# Patient Record
(demographics unavailable — no encounter records)

---

## 2025-01-15 NOTE — PHYSICAL EXAM
[Right] : right shoulder [Mild] : mild [4 ___] : forward flexion 4[unfilled]/5 [5___] : external rotation 5[unfilled]/5 [] : no sensory deficits [Left] : left shoulder [Sitting] : sitting [FreeTextEntry9] : 165/60/T12. [TWNoteComboBox4] : passive forward flexion 150 degrees [de-identified] : external rotation 70 degrees

## 2025-01-15 NOTE — IMAGING
[Right] : right shoulder [FreeTextEntry1] : 2V: The GH joint is ok. There are slight AC changes.  [FreeTextEntry5] : 2V: There is a type II acromion with a spur.

## 2025-01-15 NOTE — CONSULT LETTER
[Dear  ___] : Dear  [unfilled], [FreeTextEntry1] : Thank you for referring your patient for consultation.  Please see my note below.   If you have any questions, please do not hesitate to contact me.   Sincerely,   Garrett Cruz M.D. Shoulder Surgery

## 2025-01-15 NOTE — HISTORY OF PRESENT ILLNESS
[3] : 3 [Dull/Aching] : dull/aching [Intermittent] : intermittent [Sleep] : sleep [Lying in bed] : lying in bed [Retired] : Work status: retired [de-identified] : Right shoulder pain on and off for a few weeks. No known injury.  [] : no [FreeTextEntry1] : Right shoulder [FreeTextEntry7] : to her elbow and sometimes her wrist [FreeTextEntry9] : Advil

## 2025-01-15 NOTE — REASON FOR VISIT
[FreeTextEntry2] : This is a 73 year old RHD female retired  with right shoulder pain since the end of December 2024 without injury. No prior history/treatment for this side. She previously saw Dr. Cruz in 2021 for left shoulder impingement which was treated with an injection and PT. Reaching is sometimes uncomfortable. Night symptoms can occur. There is no n/t. NSAID use as needed with temporary relief. She has known osteoporosis. There is minimal but continued left shoulder discomfort.

## 2025-01-15 NOTE — ASSESSMENT
[FreeTextEntry1] : We discussed the underlying pathology. Treatment options reviewed. Medication use discussed. MDP is prescribed.  Aleve 440mg use discussed to be taken after MDP.  PT is planned.  If symptoms persist, an MRI is planned.  Cautions discussed. Questions answered.  Patient seen by Dr. Garrett Cruz, who determined the assessment and plan I, Dana Levine, am scribing for Dr. Garrett Cruz in his presence for the chief complaint, physical exam, studies, assessment, and/or plan.

## 2025-01-28 NOTE — PHYSICAL EXAM
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  Patient verbalized understanding.   [No Acute Distress] : no acute distress [Normal Sclera/Conjunctiva] : normal sclera/conjunctiva [Normal Outer Ear/Nose] : the outer ears and nose were normal in appearance [No JVD] : no jugular venous distention [No Lymphadenopathy] : no lymphadenopathy [No Respiratory Distress] : no respiratory distress  [No Accessory Muscle Use] : no accessory muscle use [Clear to Auscultation] : lungs were clear to auscultation bilaterally [Normal Rate] : normal rate  [Regular Rhythm] : with a regular rhythm [No Carotid Bruits] : no carotid bruits [No Edema] : there was no peripheral edema [No Extremity Clubbing/Cyanosis] : no extremity clubbing/cyanosis [Declined Breast Exam] : declined breast exam  [Soft] : abdomen soft [Non Tender] : non-tender [Non-distended] : non-distended [No Masses] : no abdominal mass palpated [Normal Anterior Cervical Nodes] : no anterior cervical lymphadenopathy [No Spinal Tenderness] : no spinal tenderness [Grossly Normal Strength/Tone] : grossly normal strength/tone [No Rash] : no rash [No Focal Deficits] : no focal deficits [Normal Gait] : normal gait [Normal Affect] : the affect was normal [Normal Insight/Judgement] : insight and judgment were intact

## 2025-01-28 NOTE — HEALTH RISK ASSESSMENT
[Yes] : Yes [2 - 4 times a month (2 pts)] : 2-4 times a month (2 points) [1 or 2 (0 pts)] : 1 or 2 (0 points) [Never (0 pts)] : Never (0 points) [No] : In the past 12 months have you used drugs other than those required for medical reasons? No [No falls in past year] : Patient reported no falls in the past year [0] : 2) Feeling down, depressed, or hopeless: Not at all (0) [PHQ-2 Negative - No further assessment needed] : PHQ-2 Negative - No further assessment needed [Never] : Never [None] : None [With Family] : lives with family [Retired] : retired [] :  [Feels Safe at Home] : Feels safe at home [Fully functional (bathing, dressing, toileting, transferring, walking, feeding)] : Fully functional (bathing, dressing, toileting, transferring, walking, feeding) [Fully functional (using the telephone, shopping, preparing meals, housekeeping, doing laundry, using] : Fully functional and needs no help or supervision to perform IADLs (using the telephone, shopping, preparing meals, housekeeping, doing laundry, using transportation, managing medications and managing finances) [Reports changes in hearing] : Reports changes in hearing [Reports normal functional visual acuity (ie: able to read med bottle)] : Reports normal functional visual acuity [With Patient/Caregiver] : , with patient/caregiver [Designated Healthcare Proxy] : Designated healthcare proxy [Relationship: ___] : Relationship: [unfilled] [Audit-CScore] : 2 [[id number]] : 0 [Change in mental status noted] : No change in mental status noted [Language] : denies difficulty with language [Behavior] : denies difficulty with behavior [Learning/Retaining New Information] : denies difficulty learning/retaining new information [Handling Complex Tasks] : denies difficulty handling complex tasks [Reasoning] : denies difficulty with reasoning [Spatial Ability and Orientation] : denies difficulty with spatial ability and orientation [Reports changes in vision] : Reports no changes in vision [Reports changes in dental health] : Reports no changes in dental health [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

## 2025-01-28 NOTE — HISTORY OF PRESENT ILLNESS
[de-identified] : Pt comes for review of results and ekg and exam and med renewal and annual medicare well visit.  PT sees Dr Diamond dubois and Dr Nuno cardio and Dr Anthony mcghee and Dr Oliver shoulder ortho  Pt UTD on colonoscopy Dr Kuhn 2024

## 2025-01-28 NOTE — REVIEW OF SYSTEMS
Daily Note /discharge summary    Today's date: 3/3/2022  Patient name: Yany Winchester  : 1948  MRN: [id number]  Referring provider: Rodney Israel MD  Dx:   Encounter Diagnosis     ICD-10-CM    1  Chronic low back pain, unspecified back pain laterality, unspecified whether sciatica present  M54 50     G89 29        Start Time: 08  Stop Time: 830  Total time in clinic (min): 23 minutes    Subjective: Pt reports 20% improvement overall  Denies LE pain, but does report numbness mildly in the LLE to the level of the toes  Pain  Current pain ratin 5  At best pain ratin  At worst pain ratin  Location: across low back and LLE      Objective: See treatment diary below  Lumbar AROM  Lumbar flex WNL and reduces pain, but pain returns upon standing within seconds  Lumbar ext limited 75% increases pain  Lumbar rotation WNL, mild pain  Lumbar side bend WNL mild pain     Lower quarter screen  LE reflexes   - Patellar and achilles absent bilat    - Neg clonus BLE    (+) slump LLE  (+) mildly positive SLR LLE  (+) ERICA L hip with reproduction of LBP  (-) FADIR bilat  (-) ERICA R hip  LE MMT grossly 5/5 throughout     Mobility testing  TTP L4-5 with increased mobility  Hypomobility throughout thoracic spine with PA mobility testing  Increased tone lumbar paraspinals     Gait:  Ambulates with flexed hips/slight forward trunk flexion     Assessment: Pt presents today for 8th therapy visit  At this time the pt reports minimal improvement in low back and LLE sx's  His pain continues to be relieved with sitting, but returns upon standing within seconds  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  LE MMT is grossly 5/5 and reflexes have not changed  However his sx's are affecting his quality of life  At this time, therapy to be placed on hold and the pt is going to f/up with his PCP to explore other interventions        Goals  Short term goals: to be met in 3-4 weeks  Pt independent with initial HEP, rationale, technique and frequency, for ROM and pain control  MET  Pt will report at least a 25% reduction in subjective pain complaints/symptoms to better manage ADLs and household chores  IMPROVED but UNMET  Improve max pain level by 2 points on the numeric pain rating scale indicating a clinically significant reduction in pain level  IMPROVED but UNMET  Pt will achieve an improvement in FOTO score indicating an improvement in pain and function  MET    Long term goals: to be met in 6-8 weeks  Pt will have full and pain free lumbar ROM to better manage ADLs and household chores  UNMET  Pt will report a 50% or > reduction in subjective pain complaints/symptoms to better manage ADLs and household chores  UNMET  Pt will be able to stand for 30 min without needing to sit secondary to pain for 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  UNMET  Pt will improve FOTO score to better then expected outcome indicating an overall improvement in pain and function  UNMET  Pt will be able to sleep through the night (6-8 hours) without waking secondary to pain  UNMET  Achieve a neg slump test indicating reduction in neural tension/irritation  UNMET  Pt independent with rationale, technique and plan for performance of advanced HEP to ensure independent self-management of symptoms upon discharge   UNMET  Achieve pts therapy goal: stand and walk without pain UNMET    Plan: Discharge and refer back to physician due to lack of progress     Precautions: HTN, hx of back surgery             Manuals 1/20 1/26  2/4  2/9  2/18  2/24  3/2  3/3       Lumbar flex /opening mobs bilat   DS    opening - DS bilat  opening - DS bilat  opening - DS bilat opening - DS bilat         Hip distraction with belt                        s/l manual post pelvic tilt with lumbar flexion b/l   DS  DS  DS  DS  DS  DS         assess        RE - DS     breaking bread stretch bilat     DS       cupping - rock pods lumbar paraspinals         Neuro Re-Ed                       LLE sciatic n glides    x10                   Post pelvic tilts    5" 3x10  5" 3x10  5"3x10  5"x20  5"x15  2min         glute set with hip ext at edge of dmitry   1xF bilat                   prone femoral n glide, contra LE off mat      1x15 b/l                  KB sit to stand      6# x10                 posterior pelvic tilts in standing        3x10  2xF  x30                                   Ther Ex                       VG       L7 5' L7 5'     key pose stretch with PT overpressure x10                     repeated flexion in standing    3"x15  3"x15  3"x15               Manual hip flexor stretch   30"x4 b/l 30"x4 b/l 30"x4 b/l  30"x4 ea  30"x4 ea + hams  30"x4 ea + hams  bilat hip flexor stretch       Seated repeated flex       5"x10  2x15  2x20            b/l knee to chest stretch   10"x10  10"x10  10"x10  10"x10  x2min  x2min          pball roll out stretch                        pt edu           5'           key pose stretch C,L,R             10"x5 ea         Ther Activity                                                                       Gait Training                                                                       Modalities                                                                     [Fever] : no fever [Chills] : no chills [Discharge] : no discharge [Vision Problems] : no vision problems [Earache] : no earache [Sore Throat] : no sore throat [Chest Pain] : no chest pain [Palpitations] : no palpitations [Lower Ext Edema] : no lower extremity edema [Shortness Of Breath] : no shortness of breath [Wheezing] : no wheezing [Cough] : no cough [Abdominal Pain] : no abdominal pain [Dysuria] : no dysuria [Joint Pain] : no joint pain [Joint Stiffness] : no joint stiffness [Skin Rash] : no skin rash [Dizziness] : no dizziness [Depression] : no depression [Swollen Glands] : no swollen glands

## 2025-01-28 NOTE — PLAN
Addended by: MIRZA BENDER on: 11/21/2024 12:41 PM     Modules accepted: Orders    
Addended by: PACO NEVAREZ on: 11/22/2024 08:07 AM     Modules accepted: Orders    
[FreeTextEntry1] : labs reviewed in detail LDL 65 HDL 66  hgba1c 5.6  bili 1.3  ua neg  Ekg unchanged  Obtain recent mammo  Discussed vaccinations